# Patient Record
Sex: FEMALE
[De-identification: names, ages, dates, MRNs, and addresses within clinical notes are randomized per-mention and may not be internally consistent; named-entity substitution may affect disease eponyms.]

---

## 2022-04-04 PROBLEM — Z00.00 ENCOUNTER FOR PREVENTIVE HEALTH EXAMINATION: Status: ACTIVE | Noted: 2022-04-04

## 2022-04-05 ENCOUNTER — APPOINTMENT (OUTPATIENT)
Dept: PULMONOLOGY | Facility: CLINIC | Age: 87
End: 2022-04-05
Payer: MEDICARE

## 2022-04-05 VITALS
SYSTOLIC BLOOD PRESSURE: 130 MMHG | BODY MASS INDEX: 21.83 KG/M2 | DIASTOLIC BLOOD PRESSURE: 88 MMHG | OXYGEN SATURATION: 97 % | TEMPERATURE: 97.3 F | WEIGHT: 131 LBS | HEART RATE: 73 BPM | HEIGHT: 65 IN

## 2022-04-05 DIAGNOSIS — J20.9 ACUTE BRONCHITIS, UNSPECIFIED: ICD-10-CM

## 2022-04-05 PROCEDURE — 99204 OFFICE O/P NEW MOD 45 MIN: CPT

## 2022-04-05 RX ORDER — ASPIRIN ENTERIC COATED TABLETS 81 MG 81 MG/1
81 TABLET, DELAYED RELEASE ORAL
Refills: 0 | Status: ACTIVE | COMMUNITY

## 2022-04-05 RX ORDER — METOPROLOL TARTRATE 100 MG/1
100 TABLET, FILM COATED ORAL
Refills: 0 | Status: ACTIVE | COMMUNITY

## 2022-04-05 NOTE — PHYSICAL EXAM
[No Acute Distress] : no acute distress [Normal Oropharynx] : normal oropharynx [Normal Appearance] : normal appearance [No Neck Mass] : no neck mass [Normal Rate/Rhythm] : normal rate/rhythm [Normal S1, S2] : normal s1, s2 [No Murmurs] : no murmurs [No Resp Distress] : no resp distress [Clear to Auscultation Bilaterally] : clear to auscultation bilaterally [No Abnormalities] : no abnormalities [Benign] : benign [Normal Gait] : normal gait [No Clubbing] : no clubbing [No Cyanosis] : no cyanosis [No Edema] : no edema [FROM] : FROM [Normal Color/ Pigmentation] : normal color/ pigmentation [No Focal Deficits] : no focal deficits [Oriented x3] : oriented x3 [Normal Affect] : normal affect [TextBox_68] : minimal rhonchi bilaterally

## 2022-04-05 NOTE — REASON FOR VISIT
[Initial] : an initial visit [Cough] : cough [Shortness of Breath] : shortness of breath [TextBox_44] : assessmnet. Pt was diagnosed with COVID-19 mid January of 2022. Pt states since then she has been experiencing SOB with minimal activity and a wet cough that presents clear to yellow phlegm and sometimes blood is present.

## 2022-04-05 NOTE — HISTORY OF PRESENT ILLNESS
[Never] : never [TextBox_4] : 86 female no hx tobacco\par presents today because of dyspnea and congestion in right lung and awakens pt ant nite and ++mucous yellow\par no fever no blood\par occ wheeze in chest  and used albuterol with relief\par covid Jan 2022 no hosp home isolation  and  both very sick\par remains fatigue\par has had multiple viral infections since\par no prior resp pathology\par rapid pulse yesterday in am and [facility] and told abn electrolytes and dced home

## 2022-04-05 NOTE — DISCUSSION/SUMMARY
[FreeTextEntry1] : Ms. Monson has residual bronchitis and congestion.  Is certainly possible this could be from long hauler Covid.  She does have mild sputum production.  She was recently at the [facility] emergency room for tachycardia and was given doxycycline 100 mg twice a day for the congestion.  I have asked her to start Trelegy 100 mg 1 inhalation once a day as there is some anecdotal reports that these can help the long-haul COVID symptoms.  Patient also instructed to take Robitussin-DM on an as-needed basis.  She understands and agrees with this plan of care.\par The patient understands and agrees with plan of care.\par Today's office visit encompassed 46  minutes. I conducted an extensive history ,physical exam and reviewed diagnosis and treatment options  including diagnostic tests,radiologic studies including cat scans  and the use of prescription medication.

## 2022-05-03 ENCOUNTER — APPOINTMENT (OUTPATIENT)
Dept: PULMONOLOGY | Facility: CLINIC | Age: 87
End: 2022-05-03
Payer: MEDICARE

## 2022-05-03 VITALS
HEIGHT: 64.5 IN | TEMPERATURE: 96.8 F | WEIGHT: 130 LBS | OXYGEN SATURATION: 98 % | SYSTOLIC BLOOD PRESSURE: 110 MMHG | BODY MASS INDEX: 21.93 KG/M2 | DIASTOLIC BLOOD PRESSURE: 70 MMHG | HEART RATE: 64 BPM

## 2022-05-03 PROCEDURE — 99214 OFFICE O/P EST MOD 30 MIN: CPT

## 2022-05-03 RX ORDER — POTASSIUM CHLORIDE 1500 MG/1
20 TABLET, FILM COATED, EXTENDED RELEASE ORAL
Refills: 0 | Status: DISCONTINUED | COMMUNITY
End: 2022-05-03

## 2022-05-03 NOTE — REASON FOR VISIT
[Follow-Up] : a follow-up visit [Cough] : cough [Shortness of Breath] : shortness of breath [Wheezing] : wheezing [TextBox_44] : 1 month. Pt presents similar sx as last ov. Pt states antibiotics subsided her sx but once completed sx came back, Pt complains of SOB with minimal activity, a wet cough that presents yellow thick phlegm and occasional wheezing.

## 2022-05-03 NOTE — DISCUSSION/SUMMARY
[FreeTextEntry1] : Ms. Monson has very nonspecific symptoms of cough and dyspnea.  She has no history of tobacco and is possible this is all long-haul COVID symptoms.  She is no longer using her Trelegy and I favor she restart the Trelegy 1 inhalation daily and see if this offers any relief.  The patient understands and agrees with this plan of care.\par The patient understands and agrees with plan of care.\par Today's office visit encompassed 32 minutes. I conducted an extensive history ,physical exam and reviewed diagnosis and treatment options  including diagnostic tests,radiologic studies including  cat scans  and the use of prescription medication.

## 2022-05-03 NOTE — HISTORY OF PRESENT ILLNESS
[Never] : never [TextBox_4] : 86 female hx bronchitis for fu visit\par episode of dizziness this am requiring lying in bed\par sx resolved after 5 minutes  \par seen by pmd yesterday and inc dose of losartan\par hx TIA March 2022 and now on xarelto no carotid bolckage\par breathing sl labored  with shower sl wheeze  and cough and congestion\par used trelegy  and stopped ?reason

## 2022-05-20 ENCOUNTER — APPOINTMENT (OUTPATIENT)
Dept: PULMONOLOGY | Facility: CLINIC | Age: 87
End: 2022-05-20
Payer: MEDICARE

## 2022-05-20 VITALS
SYSTOLIC BLOOD PRESSURE: 119 MMHG | TEMPERATURE: 97.3 F | HEIGHT: 64.5 IN | WEIGHT: 130 LBS | HEART RATE: 68 BPM | DIASTOLIC BLOOD PRESSURE: 73 MMHG | OXYGEN SATURATION: 96 % | BODY MASS INDEX: 21.93 KG/M2

## 2022-05-20 DIAGNOSIS — J90 PLEURAL EFFUSION, NOT ELSEWHERE CLASSIFIED: ICD-10-CM

## 2022-05-20 PROCEDURE — 99214 OFFICE O/P EST MOD 30 MIN: CPT

## 2022-05-20 NOTE — REASON FOR VISIT
[Cough] : cough [Shortness of Breath] : shortness of breath [TextBox_44] : pt still presents laryngitis, body weakness, loss of appetite and SOB with minimal activity.

## 2022-05-20 NOTE — DISCUSSION/SUMMARY
[FreeTextEntry1] : Ms. Monson has multiple nonspecific symptoms.  The CAT scan of the chest reveals small pleural effusions left greater than right.  I do not think there accessible for thoracentesis.  She does have enlarged heart but no signs of fluid overload.  I asked her to obtain an echocardiogram to ensure there is normal cardiac function.  She continues to cough up discolored phlegm and rather than initiate another antibiotic we will obtain him culture to see if any dominant organism is present.  I will discuss this with her primary care physician Dr. Cruz when available.  This been discussed with the patient she understands and agrees.\par The patient understands and agrees with plan of care.\par Today's office visit encompassed 32 minutes. I conducted an extensive history ,physical exam and reviewed diagnosis and treatment options  including diagnostic tests,radiologic studies including  cat scans  and the use of prescription medication.

## 2022-05-20 NOTE — PHYSICAL EXAM
[No Acute Distress] : no acute distress [Normal Oropharynx] : normal oropharynx [Normal Appearance] : normal appearance [No Neck Mass] : no neck mass [Normal Rate/Rhythm] : normal rate/rhythm [Normal S1, S2] : normal s1, s2 [No Murmurs] : no murmurs [No Resp Distress] : no resp distress [Clear to Auscultation Bilaterally] : clear to auscultation bilaterally [No Abnormalities] : no abnormalities [Benign] : benign [Normal Gait] : normal gait [No Clubbing] : no clubbing [No Cyanosis] : no cyanosis [No Edema] : no edema [FROM] : FROM [Normal Color/ Pigmentation] : normal color/ pigmentation [No Focal Deficits] : no focal deficits [Oriented x3] : oriented x3 [Normal Affect] : normal affect [TextBox_68] : sll rhonchi right base

## 2022-05-20 NOTE — HISTORY OF PRESENT ILLNESS
[Never] : never [TextBox_4] : 86 female hx sob and wt loss  low fever and ct chest bilateral effusions\par felt better after abx\par today feels poor overall sl improved weak\par no fever no chest pain no tightness \par

## 2022-05-26 DIAGNOSIS — R09.02 HYPOXEMIA: ICD-10-CM

## 2022-05-31 ENCOUNTER — NON-APPOINTMENT (OUTPATIENT)
Age: 87
End: 2022-05-31

## 2022-06-22 ENCOUNTER — NON-APPOINTMENT (OUTPATIENT)
Age: 87
End: 2022-06-22

## 2022-06-28 ENCOUNTER — APPOINTMENT (OUTPATIENT)
Dept: PULMONOLOGY | Facility: CLINIC | Age: 87
End: 2022-06-28

## 2022-06-28 VITALS
SYSTOLIC BLOOD PRESSURE: 118 MMHG | BODY MASS INDEX: 21.93 KG/M2 | HEIGHT: 64.5 IN | TEMPERATURE: 96.9 F | OXYGEN SATURATION: 97 % | DIASTOLIC BLOOD PRESSURE: 80 MMHG | HEART RATE: 73 BPM | WEIGHT: 130 LBS

## 2022-06-28 PROCEDURE — 99214 OFFICE O/P EST MOD 30 MIN: CPT

## 2022-06-28 RX ORDER — CIPROFLOXACIN HYDROCHLORIDE 500 MG/1
500 TABLET, FILM COATED ORAL TWICE DAILY
Qty: 42 | Refills: 1 | Status: DISCONTINUED | COMMUNITY
Start: 2022-05-31 | End: 2022-06-28

## 2022-06-28 NOTE — HISTORY OF PRESENT ILLNESS
[Never] : never [TextBox_4] : 86 female hx dyspnea and chf for fu \par today feels poor co hoarse voice and cough an mucous \par poor appetite and has lost 2 lb in 1 week\par dx covid december 2021 \par treated with cipro for staph in past  and now inc sputum once again no fever  +yellow

## 2022-06-28 NOTE — PROCEDURE
[FreeTextEntry1] : Echocardiogram performed May 24, 2022 reduced LV function EF 30 to 35%.  Moderate tricuspid regurg and severe pulmonary hypertension\par Sputum culture revealed staph aureus susceptible to Cipro\par CT chest angiogram 5/27/2022 no pulm embolus small bilateral effusions enlarged heart

## 2022-06-28 NOTE — REASON FOR VISIT
[Follow-Up] : a follow-up visit [Cough] : cough [Shortness of Breath] : shortness of breath [TextBox_44] : 6 weeks, pleural effusion. Pt complains of SOB with minimal activity, a productive cough that presents clear phlegm and crackling in chest when in supine position. Pt would like to inquire about Trelegy as it has given her relief in the past.

## 2022-06-28 NOTE — DISCUSSION/SUMMARY
[FreeTextEntry1] : Ms. Monson has multiple nonspecific symptoms.  Her cough has returned with discolored sputum.  She was treated for staph aureus approxi-1 month ago with Cipro.  She is not allergic to penicillin and it is methicillin sensitive.  I have asked her to take amoxicillin 500 mg 3 times a day for the next 10 days.  She had some relief with Trelegy in the past.  I have asked her to start Trelegy 100 mg 1 inhalation daily.  She does not seem to be in congestive heart failure at this time.  Is possible her multiple symptoms are secondary to long-haul COVID.  There is been explained to the patient she understands and agrees.\par The patient understands and agrees with plan of care.\par Today's office visit encompassed 32 minutes. I conducted an extensive history ,physical exam and reviewed diagnosis and treatment options  including diagnostic tests,radiologic studies including  cat scans  and the use of prescription medication. \par

## 2022-07-12 ENCOUNTER — NON-APPOINTMENT (OUTPATIENT)
Age: 87
End: 2022-07-12

## 2022-07-12 ENCOUNTER — APPOINTMENT (OUTPATIENT)
Dept: PULMONOLOGY | Facility: CLINIC | Age: 87
End: 2022-07-12

## 2022-07-12 VITALS
SYSTOLIC BLOOD PRESSURE: 118 MMHG | OXYGEN SATURATION: 91 % | HEIGHT: 64 IN | DIASTOLIC BLOOD PRESSURE: 68 MMHG | BODY MASS INDEX: 21.17 KG/M2 | HEART RATE: 63 BPM | WEIGHT: 124 LBS | TEMPERATURE: 96.9 F

## 2022-07-12 DIAGNOSIS — U09.9 POST COVID-19 CONDITION, UNSPECIFIED: ICD-10-CM

## 2022-07-12 PROCEDURE — 94010 BREATHING CAPACITY TEST: CPT

## 2022-07-12 PROCEDURE — 99214 OFFICE O/P EST MOD 30 MIN: CPT | Mod: 25

## 2022-07-12 NOTE — DISCUSSION/SUMMARY
[FreeTextEntry1] : Ms. Monson has persistent dyspnea on exertion and coughing with congestion.  She has been on multiple antibiotics.  She does not feel the Trelegy is helping and her spirometry is normal.  I favor we DC the inhaler.  Patient does note some wheezing occasionally in the morning but I do not hear any on exam.  Her echocardiogram does show an ejection fraction of 30 to 35% and I am concerned that all of her shortness of breath and lethargy is secondary to her underlying cardiac dysfunction.  I have asked her to obtain a sputum culture.  Based on those results we will decide on antibiotic therapy.  The patient understands and agrees with this plan of care.\par The patient understands and agrees with plan of care.\par Today's office visit encompassed 32 minutes. I conducted an extensive history ,physical exam and reviewed diagnosis and treatment options  including diagnostic tests,radiologic studies including  cat scans  and the use of prescription medication.

## 2022-07-12 NOTE — REASON FOR VISIT
[Follow-Up] : a follow-up visit [Cough] : cough [Shortness of Breath] : shortness of breath [Wheezing] : wheezing [TextBox_44] : 2 weeks, long haul covid symptoms

## 2022-07-12 NOTE — HISTORY OF PRESENT ILLNESS
[Never] : never [TextBox_4] : 87 female hx covid long hauler for fu visit\par recently treated amoxicillin for staph \par no improvement on amoxicillin\par no fever ++phlegm still  thick and brown\par no chest pain no tightness except sharp pain center of chest lasted 30 sec\par ++wheeze and congestion bulmaro in am\par using trelegy 1 qd and no change\par +covid vaccine\par

## 2022-08-09 ENCOUNTER — APPOINTMENT (OUTPATIENT)
Dept: PULMONOLOGY | Facility: CLINIC | Age: 87
End: 2022-08-09

## 2022-08-09 VITALS
BODY MASS INDEX: 20.83 KG/M2 | HEIGHT: 64 IN | OXYGEN SATURATION: 96 % | TEMPERATURE: 97.3 F | DIASTOLIC BLOOD PRESSURE: 68 MMHG | WEIGHT: 122 LBS | SYSTOLIC BLOOD PRESSURE: 101 MMHG | HEART RATE: 66 BPM

## 2022-08-09 PROCEDURE — 99213 OFFICE O/P EST LOW 20 MIN: CPT

## 2022-08-09 RX ORDER — LOSARTAN POTASSIUM 100 MG/1
100 TABLET, FILM COATED ORAL
Refills: 0 | Status: DISCONTINUED | COMMUNITY
End: 2022-08-09

## 2022-08-09 RX ORDER — METHYLPREDNISOLONE 4 MG/1
4 TABLET ORAL
Qty: 21 | Refills: 0 | Status: DISCONTINUED | COMMUNITY
Start: 2022-02-21 | End: 2022-08-09

## 2022-08-09 RX ORDER — ATORVASTATIN CALCIUM 10 MG/1
10 TABLET, FILM COATED ORAL
Refills: 0 | Status: DISCONTINUED | COMMUNITY
End: 2022-08-09

## 2022-08-09 RX ORDER — DOXYCYCLINE HYCLATE 100 MG/1
100 CAPSULE ORAL
Qty: 14 | Refills: 0 | Status: DISCONTINUED | COMMUNITY
Start: 2022-04-04 | End: 2022-08-09

## 2022-08-09 NOTE — REASON FOR VISIT
[Follow-Up] : a follow-up visit [Shortness of Breath] : shortness of breath [TextBox_44] : 1 month, pleural effusion. Sputum culture done at [facility]. Pt presents similar sx. Still feeling fatigue and body weakness.

## 2022-08-09 NOTE — DISCUSSION/SUMMARY
[FreeTextEntry1] : Ms. Monson has no acute respiratory distress at this time.  She had a staff upper respiratory infection which was treated appropriately with amoxicillin.  She denies any significant symptoms.  She has been seen by Dr. Roberts cardiology and her congestive heart failure is being treated aggressively.  At this time I do not think there is any acute pulmonary issue and no further therapy recommended.\par The patient understands and agrees with plan of care.\par Today's office visit encompassed 32 minutes. I conducted an extensive history ,physical exam and reviewed diagnosis and treatment options  including diagnostic tests,radiologic studies including  cat scans  and the use of prescription medication.

## 2022-08-09 NOTE — HISTORY OF PRESENT ILLNESS
[Never] : never [TextBox_4] : 87 female hx sob and bronchitis\par co some lethargy\par dec amt phlegm but worse in am and congestion in chest \par seen by cardiology and told chf and meds altered\par

## 2022-11-10 ENCOUNTER — APPOINTMENT (OUTPATIENT)
Dept: PULMONOLOGY | Facility: CLINIC | Age: 87
End: 2022-11-10

## 2022-11-10 VITALS
BODY MASS INDEX: 20.83 KG/M2 | OXYGEN SATURATION: 97 % | DIASTOLIC BLOOD PRESSURE: 70 MMHG | TEMPERATURE: 97.9 F | SYSTOLIC BLOOD PRESSURE: 122 MMHG | WEIGHT: 122 LBS | HEART RATE: 65 BPM | HEIGHT: 64 IN

## 2022-11-10 DIAGNOSIS — R06.09 OTHER FORMS OF DYSPNEA: ICD-10-CM

## 2022-11-10 PROCEDURE — 99214 OFFICE O/P EST MOD 30 MIN: CPT

## 2022-11-10 RX ORDER — FUROSEMIDE 20 MG/1
20 TABLET ORAL
Qty: 10 | Refills: 0 | Status: ACTIVE | COMMUNITY
Start: 2022-10-25

## 2022-11-10 NOTE — DISCUSSION/SUMMARY
[FreeTextEntry1] : Ms. Monson has a cough for the last 2 to 3 months.  It is discolored with occasional blood.  It is likely the blood is from the constant coughing and irritation to the airway.  I have asked him to obtain a chest x-ray to see if any infiltrate is present and sputum cultures to see if any specific bacteria is present.  We will start her on amoxicillin 500 mg 3 times a day for the next 10 days.  She is found in the past that this is helped her breathing.  The patient will return in approximately 2 to 3 weeks time to check on her progress.\par The patient understands and agrees with plan of care.\par Today's office visit encompassed 32 minutes. I conducted an extensive history ,physical exam and reviewed diagnosis and treatment options  including diagnostic tests,radiologic studies including  cat scans  and the use of prescription medication.

## 2022-11-10 NOTE — HISTORY OF PRESENT ILLNESS
[Never] : never [TextBox_4] : 87 female hx chf for fu visit\par today co cough  and mucous +brown yellow and occ blood\par no fever no chest pain intermittent wheeze and noise aright lung with lying down\par this has occurred for  3 months\par no ill exposure \par full activity  full  activities of daily living \par

## 2022-11-10 NOTE — REASON FOR VISIT
[Follow-Up] : a follow-up visit [Cough] : cough [Shortness of Breath] : shortness of breath [Wheezing] : wheezing [TextBox_44] : 4 months and pleural effusion. Pt states that the coughing up blood has come back.

## 2022-11-21 ENCOUNTER — NON-APPOINTMENT (OUTPATIENT)
Age: 87
End: 2022-11-21

## 2022-12-01 ENCOUNTER — APPOINTMENT (OUTPATIENT)
Dept: PULMONOLOGY | Facility: CLINIC | Age: 87
End: 2022-12-01

## 2022-12-01 VITALS
BODY MASS INDEX: 20.66 KG/M2 | HEIGHT: 64 IN | OXYGEN SATURATION: 94 % | HEART RATE: 65 BPM | SYSTOLIC BLOOD PRESSURE: 132 MMHG | TEMPERATURE: 97.4 F | DIASTOLIC BLOOD PRESSURE: 80 MMHG | WEIGHT: 121 LBS

## 2022-12-01 DIAGNOSIS — J20.9 ACUTE BRONCHITIS, UNSPECIFIED: ICD-10-CM

## 2022-12-01 PROCEDURE — 99214 OFFICE O/P EST MOD 30 MIN: CPT

## 2022-12-01 NOTE — DISCUSSION/SUMMARY
[FreeTextEntry1] : Ms Monson has a chronic cough with discolored sputum and occasional blood streaking.  She denies all other symptoms.  Her sputum culture showed normal gayle.  We do not have the AFB or fungus cultures back as of yet.  I favor a trial of antibiotics.  She is taken penicillin in the past without difficulty but because of the controversy we will start doxycycline 100 mg of 1 tablet twice a day for the next 10 days.  If the patient does not feel any better she will call me for further evaluation.\par The patient understands and agrees with plan of care.\par Today's office visit encompassed 32 minutes. I conducted an extensive history ,physical exam and reviewed diagnosis and treatment options  including diagnostic tests,radiologic studies including  cat scans  and the use of prescription medication.

## 2022-12-01 NOTE — REASON FOR VISIT
[Follow-Up] : a follow-up visit [Cough] : cough [Shortness of Breath] : shortness of breath [TextBox_44] : 3 week, Pleural effusion Dyspnea on excertion, Hypoxia, Patient states red and green mucus when coughing up phlegm.

## 2022-12-01 NOTE — HISTORY OF PRESENT ILLNESS
[Never] : never [TextBox_4] : 87 female hx cough for few months \par sputum cx sent ot [facility] no results sent to office\par did not nuse amoxicillin bec of ?allergy  but has used pcn and amoxicillin in past\par still cough with occ blood stain in sputum\par no fever no chest pain no tightness \par +occ dyspnea + wheeze

## 2023-09-26 ENCOUNTER — APPOINTMENT (OUTPATIENT)
Dept: ORTHOPEDIC SURGERY | Facility: CLINIC | Age: 88
End: 2023-09-26
Payer: MEDICARE

## 2023-09-26 VITALS — BODY MASS INDEX: 20.66 KG/M2 | HEIGHT: 64 IN | WEIGHT: 121 LBS

## 2023-09-26 PROCEDURE — 99213 OFFICE O/P EST LOW 20 MIN: CPT

## 2023-09-26 PROCEDURE — 73560 X-RAY EXAM OF KNEE 1 OR 2: CPT | Mod: FY,LT

## 2023-09-26 PROCEDURE — L1833: CPT | Mod: KV,KX,LT

## 2023-10-17 ENCOUNTER — APPOINTMENT (OUTPATIENT)
Dept: ORTHOPEDIC SURGERY | Facility: CLINIC | Age: 88
End: 2023-10-17
Payer: MEDICARE

## 2023-10-17 VITALS — BODY MASS INDEX: 20.66 KG/M2 | WEIGHT: 121 LBS | HEIGHT: 64 IN

## 2023-10-17 PROCEDURE — 73560 X-RAY EXAM OF KNEE 1 OR 2: CPT | Mod: LT

## 2023-10-17 PROCEDURE — 99024 POSTOP FOLLOW-UP VISIT: CPT

## 2023-11-28 ENCOUNTER — APPOINTMENT (OUTPATIENT)
Dept: ORTHOPEDIC SURGERY | Facility: CLINIC | Age: 88
End: 2023-11-28
Payer: MEDICARE

## 2023-11-28 VITALS — BODY MASS INDEX: 20.66 KG/M2 | WEIGHT: 121 LBS | HEIGHT: 64 IN

## 2023-11-28 DIAGNOSIS — Z78.9 OTHER SPECIFIED HEALTH STATUS: ICD-10-CM

## 2023-11-28 DIAGNOSIS — Z96.652 PRESENCE OF LEFT ARTIFICIAL KNEE JOINT: ICD-10-CM

## 2023-11-28 PROCEDURE — 73562 X-RAY EXAM OF KNEE 3: CPT | Mod: LT

## 2023-11-28 PROCEDURE — 99024 POSTOP FOLLOW-UP VISIT: CPT

## 2023-12-28 ENCOUNTER — APPOINTMENT (OUTPATIENT)
Dept: PULMONOLOGY | Facility: CLINIC | Age: 88
End: 2023-12-28

## 2024-01-10 ENCOUNTER — APPOINTMENT (OUTPATIENT)
Dept: PULMONOLOGY | Facility: CLINIC | Age: 89
End: 2024-01-10
Payer: MEDICARE

## 2024-01-10 VITALS
DIASTOLIC BLOOD PRESSURE: 70 MMHG | WEIGHT: 121 LBS | HEART RATE: 74 BPM | BODY MASS INDEX: 20.66 KG/M2 | HEIGHT: 64 IN | SYSTOLIC BLOOD PRESSURE: 124 MMHG | TEMPERATURE: 97.3 F | OXYGEN SATURATION: 97 %

## 2024-01-10 DIAGNOSIS — U07.1 COVID-19: ICD-10-CM

## 2024-01-10 PROCEDURE — 99214 OFFICE O/P EST MOD 30 MIN: CPT

## 2024-01-10 RX ORDER — AMOXICILLIN 500 MG/1
500 TABLET, FILM COATED ORAL 3 TIMES DAILY
Qty: 30 | Refills: 3 | Status: DISCONTINUED | COMMUNITY
Start: 2022-06-28 | End: 2024-01-10

## 2024-01-10 RX ORDER — ZINC SULFATE 50(220)MG
50 CAPSULE ORAL
Refills: 0 | Status: DISCONTINUED | COMMUNITY
End: 2024-01-10

## 2024-01-10 RX ORDER — ASCORBIC ACID 500 MG
TABLET ORAL
Refills: 0 | Status: DISCONTINUED | COMMUNITY
End: 2024-01-10

## 2024-01-10 RX ORDER — DOXYCYCLINE HYCLATE 100 MG/1
100 TABLET ORAL
Qty: 20 | Refills: 1 | Status: DISCONTINUED | COMMUNITY
Start: 2022-12-01 | End: 2024-01-10

## 2024-01-10 RX ORDER — RIVAROXABAN 20 MG/1
20 TABLET, FILM COATED ORAL
Refills: 0 | Status: DISCONTINUED | COMMUNITY
End: 2024-01-10

## 2024-01-10 RX ORDER — AMLODIPINE BESYLATE 2.5 MG/1
2.5 TABLET ORAL
Qty: 90 | Refills: 0 | Status: DISCONTINUED | COMMUNITY
Start: 2021-09-08 | End: 2024-01-10

## 2024-01-10 RX ORDER — LOSARTAN POTASSIUM 50 MG/1
50 TABLET, FILM COATED ORAL
Qty: 90 | Refills: 0 | Status: DISCONTINUED | COMMUNITY
Start: 2021-09-16 | End: 2024-01-10

## 2024-01-10 NOTE — REASON FOR VISIT
[Follow-Up] : a follow-up visit [Cough] : cough [Spouse] : spouse [Other: _____] : [unfilled] [TextBox_44] : Patient states she has an excessive amount of phlegm that is hard to cough up.  She has crackling in her chest.

## 2024-01-10 NOTE — HISTORY OF PRESENT ILLNESS
[Never] : never [TextBox_4] : 88 female +covid 3 weeks ago no hosp but went for outpt iv therapy today feels poor weak and cough and  mucous  no fever

## 2024-01-10 NOTE — DISCUSSION/SUMMARY
[FreeTextEntry1] : Ms. Monson recently had COVID infection.  She refused to be admitted to the hospital but went back daily for infusions.  She is feeling better but still slight cough and congestion.  Her lungs are clear.  I do not want to start her on any oral steroids as she has a fairly normal exam.  I favor supportive care i.e. cold and sinus medicines for decongestant and antitussive effects.  If her symptoms worsen she will return. The patient understands and agrees with plan of care. Today's office visit encompassed 32 minutes. I conducted an extensive history, physical exam and reviewed diagnosis and treatment options including diagnostic tests,radiology studies including cat scans and the use of prescription medication.

## 2024-01-11 ENCOUNTER — APPOINTMENT (OUTPATIENT)
Dept: ORTHOPEDIC SURGERY | Facility: CLINIC | Age: 89
End: 2024-01-11
Payer: MEDICARE

## 2024-01-11 DIAGNOSIS — S72.452A DISPLACED SUPRACONDYLAR FRACTURE W/OUT INTRACONDYLAR EXTENSION OF LOWER END OF LEFT FEMUR, INITIAL ENCOUNTER FOR CLOSED FRACTURE: ICD-10-CM

## 2024-01-11 PROCEDURE — 99213 OFFICE O/P EST LOW 20 MIN: CPT

## 2024-01-11 PROCEDURE — 73562 X-RAY EXAM OF KNEE 3: CPT | Mod: LT

## 2024-01-11 NOTE — DISCUSSION/SUMMARY
[de-identified] : Reviewed x-rays Explained the importance of ice and rest.    Stretching exercises shown, Explained the importance of Range of Motion before strength.  discussed with patient to procced with walking to exercise the muscles.    f/u PNR

## 2024-01-11 NOTE — PHYSICAL EXAM
[NL (0)] : extension 0 degrees [5___] : hamstring 5[unfilled]/5 [Left] : left knee [AP] : anteroposterior [Lateral] : lateral [[place]] : skyline [] : no extensor lag [FreeTextEntry9] : WA small calcifications at fracture sight no displaces of fracture.   [TWNoteComboBox7] : flexion 125 degrees

## 2024-01-11 NOTE — PHYSICAL EXAM
[NL (0)] : extension 0 degrees [5___] : hamstring 5[unfilled]/5 [Left] : left knee [AP] : anteroposterior [Lateral] : lateral [[place]] : skyline [] : no extensor lag [FreeTextEntry9] : WA small calcifications at fracture sight no displaces of fracture.   [TWNoteComboBox7] : flexion 125 degrees

## 2024-01-16 ENCOUNTER — APPOINTMENT (OUTPATIENT)
Dept: PULMONOLOGY | Facility: CLINIC | Age: 89
End: 2024-01-16

## 2024-03-14 ENCOUNTER — APPOINTMENT (OUTPATIENT)
Dept: PULMONOLOGY | Facility: CLINIC | Age: 89
End: 2024-03-14

## 2024-03-26 ENCOUNTER — APPOINTMENT (OUTPATIENT)
Dept: PULMONOLOGY | Facility: CLINIC | Age: 89
End: 2024-03-26
Payer: MEDICARE

## 2024-03-26 VITALS
WEIGHT: 121 LBS | HEIGHT: 64 IN | SYSTOLIC BLOOD PRESSURE: 132 MMHG | DIASTOLIC BLOOD PRESSURE: 90 MMHG | HEART RATE: 65 BPM | OXYGEN SATURATION: 95 % | TEMPERATURE: 96.3 F | BODY MASS INDEX: 20.66 KG/M2

## 2024-03-26 PROCEDURE — 99214 OFFICE O/P EST MOD 30 MIN: CPT

## 2024-03-26 RX ORDER — CHOLECALCIFEROL (VITAMIN D3) 25 MCG
TABLET ORAL
Refills: 0 | Status: DISCONTINUED | COMMUNITY
End: 2024-03-26

## 2024-03-26 NOTE — DISCUSSION/SUMMARY
[FreeTextEntry1] : Ms. Monson has chronic cough and sputum production.  This is persistent over the last 2 years since COVID.  Her exam is clear at this time.  We are going to obtain a sputum culture.  I have also asked her to obtain a chest x-ray and sinus x-rays.  Once these results are available further recommendations will be made. The patient understands and agrees with plan of care. Today's office visit encompassed 32 minutes. I conducted an extensive history, physical exam and reviewed diagnosis and treatment options including diagnostic tests,radiology studies including cat scans and the use of prescription medication.

## 2024-03-26 NOTE — REASON FOR VISIT
[Follow-Up] : a follow-up visit [Cough] : cough [Shortness of Breath] : shortness of breath [Spouse] : spouse [Other: _____] : [unfilled] [TextBox_44] : sneezing [TextEntry] : Patient states she is stll experiencing a wet phlegm with her cough, sometimes there is blood in the phlegm.  Patient has occasional SOB.

## 2024-03-26 NOTE — HISTORY OF PRESENT ILLNESS
[Never] : never [TextBox_4] : 88 female co persistent mucous  since covid  2 y ago thick phlegm and occ blood  no chest pain no tightness no wheeze no fever  occ dyspnea   some wheeze if lying down

## 2024-04-04 ENCOUNTER — APPOINTMENT (OUTPATIENT)
Dept: ORTHOPEDIC SURGERY | Facility: CLINIC | Age: 89
End: 2024-04-04
Payer: MEDICARE

## 2024-04-04 VITALS — WEIGHT: 121 LBS | HEIGHT: 64 IN | BODY MASS INDEX: 20.66 KG/M2

## 2024-04-04 DIAGNOSIS — Z96.652 PRESENCE OF LEFT ARTIFICIAL KNEE JOINT: ICD-10-CM

## 2024-04-04 DIAGNOSIS — Z78.9 OTHER SPECIFIED HEALTH STATUS: ICD-10-CM

## 2024-04-04 PROCEDURE — 73562 X-RAY EXAM OF KNEE 3: CPT | Mod: LT

## 2024-04-04 PROCEDURE — 99213 OFFICE O/P EST LOW 20 MIN: CPT

## 2024-04-04 NOTE — DISCUSSION/SUMMARY
[de-identified] : She has been icing knee with good relief. She states she is on blood thinners and therefore NSAIDS are contraindicated. She is ambulating well with cane. She may take Tylenol to help manage her pain.  Today's plan is HEP and continue icing and heat.  f/u PRN

## 2024-04-04 NOTE — REASON FOR VISIT
[FreeTextEntry2] : here for left knee pain that started 2 days ago NKI.  c/o increasing pain medially  using cane for ambulation, painful to put weight on it. using tylenol and ice for pain.  states knee pain is better with rest.

## 2024-04-04 NOTE — PHYSICAL EXAM
[NL (0)] : extension 0 degrees [4___] : hamstring 4[unfilled]/5 [] : ambulation with cane [Left] : left knee [All Views] : anteroposterior, lateral, skyline, and anteroposterior standing [Components well fixed, in good position] : Components well fixed, in good position [FreeTextEntry8] : medial hamstring tendons at medial joint line. tender pes anserinus [de-identified] : valgus alignment [FreeTextEntry9] : well aligned no new fracture lines [TWNoteComboBox7] : flexion 120 degrees

## 2024-04-16 ENCOUNTER — NON-APPOINTMENT (OUTPATIENT)
Age: 89
End: 2024-04-16

## 2024-04-16 ENCOUNTER — APPOINTMENT (OUTPATIENT)
Dept: PULMONOLOGY | Facility: CLINIC | Age: 89
End: 2024-04-16
Payer: MEDICARE

## 2024-04-16 VITALS
WEIGHT: 121 LBS | SYSTOLIC BLOOD PRESSURE: 120 MMHG | DIASTOLIC BLOOD PRESSURE: 78 MMHG | TEMPERATURE: 98 F | BODY MASS INDEX: 20.66 KG/M2 | HEART RATE: 60 BPM | HEIGHT: 64 IN | OXYGEN SATURATION: 96 %

## 2024-04-16 PROCEDURE — 99214 OFFICE O/P EST MOD 30 MIN: CPT

## 2024-04-16 RX ORDER — DABIGATRAN ETEXILATE 75 MG/1
75 CAPSULE ORAL
Refills: 0 | Status: ACTIVE | COMMUNITY

## 2024-04-16 RX ORDER — ATORVASTATIN CALCIUM 10 MG/1
10 TABLET, FILM COATED ORAL
Refills: 0 | Status: ACTIVE | COMMUNITY

## 2024-04-16 NOTE — DISCUSSION/SUMMARY
[FreeTextEntry1] : Patient has persistent cough with thick discolored phlegm.  Chest x-ray and sinus x-rays are normal.  Often these are secondary to gram-negative's.  She will be bringing in sputum cultures today.  We will try Cipro 500 mg twice daily for the next 14 days.  If the sputum cultures reveal another bacteria we will change her antibiotics.  This been explained to the patient she understands and agrees. The patient understands and agrees with plan of care. Today's office visit encompassed 32 minutes. I conducted an extensive history, physical exam and reviewed diagnosis and treatment options including diagnostic tests,radiology studies including cat scans and the use of prescription medication.

## 2024-04-16 NOTE — HISTORY OF PRESENT ILLNESS
[Never] : never [TextBox_4] : 88 female co persistent cough  a nd congestion and sneeze 8 x per day no new exposure at home  + phlegm grey with occ blood  occ brown never obtained culture no fever no chest pain no tightness  o noise right thorax

## 2024-04-16 NOTE — REASON FOR VISIT
[Follow-Up] : a follow-up visit [Cough] : cough [Shortness of Breath] : shortness of breath [TextEntry] : Patient states she is still experiencing a wet phlegm with her cough, sometimes there is blood in the phlegm. Patient complaints of sob and crackles of her lung when breathing.

## 2024-05-15 ENCOUNTER — NON-APPOINTMENT (OUTPATIENT)
Age: 89
End: 2024-05-15

## 2024-05-16 ENCOUNTER — APPOINTMENT (OUTPATIENT)
Dept: PULMONOLOGY | Facility: CLINIC | Age: 89
End: 2024-05-16
Payer: MEDICARE

## 2024-05-16 VITALS
HEIGHT: 64 IN | TEMPERATURE: 97.9 F | DIASTOLIC BLOOD PRESSURE: 80 MMHG | OXYGEN SATURATION: 97 % | BODY MASS INDEX: 20.66 KG/M2 | WEIGHT: 121 LBS | SYSTOLIC BLOOD PRESSURE: 120 MMHG | HEART RATE: 64 BPM

## 2024-05-16 DIAGNOSIS — R05.3 CHRONIC COUGH: ICD-10-CM

## 2024-05-16 DIAGNOSIS — R09.82 POSTNASAL DRIP: ICD-10-CM

## 2024-05-16 PROCEDURE — 99214 OFFICE O/P EST MOD 30 MIN: CPT

## 2024-05-16 RX ORDER — IPRATROPIUM BROMIDE 21 UG/1
0.03 SPRAY NASAL TWICE DAILY
Qty: 1 | Refills: 6 | Status: ACTIVE | COMMUNITY
Start: 2024-05-16 | End: 1900-01-01

## 2024-05-16 RX ORDER — FLUOXETINE HYDROCHLORIDE 10 MG/1
10 CAPSULE ORAL
Qty: 30 | Refills: 0 | Status: DISCONTINUED | COMMUNITY
Start: 2022-05-16 | End: 2024-05-16

## 2024-05-16 RX ORDER — CIPROFLOXACIN HYDROCHLORIDE 500 MG/1
500 TABLET, FILM COATED ORAL TWICE DAILY
Qty: 28 | Refills: 1 | Status: DISCONTINUED | COMMUNITY
Start: 2024-04-16 | End: 2024-05-16

## 2024-05-16 NOTE — DISCUSSION/SUMMARY
[FreeTextEntry1] : Ms. Monson has a chronic cough and congestion.  There is no definite evidence of primary pulmonary disease.  It is possible to postnasal drip and allergy season.  We will start Atrovent nasal spray 2 sprays twice a day and see if this resolves symptoms.  She recently had a chest x-ray at [facility] we will obtain that study.  Also she says she sent in sputum cultures the results are not available but will obtain those as well. The patient understands and agrees with plan of care. Today's office visit encompassed 32 minutes. I conducted an extensive history, physical exam and reviewed diagnosis and treatment options including diagnostic tests,radiology studies including cat scans and the use of prescription medication.

## 2024-05-16 NOTE — REASON FOR VISIT
[Follow-Up] : a follow-up visit [Cough] : cough [Shortness of Breath] : shortness of breath [TextEntry] : 1 month. Patient feel no improvement with the medication. Patient still experiencing a wet phlegm with her cough, sometimes there is blood in the phlegm. Patient complaints of sob and crackles of her lung when breathing.

## 2024-05-16 NOTE — HISTORY OF PRESENT ILLNESS
[Never] : never [TextBox_4] : 88 female hx ch bronchitis  today feels fair still with cough and mucous  and sneeze ++ sob and no wheeze  +  post nasal drip syndrome

## 2024-06-17 ENCOUNTER — NON-APPOINTMENT (OUTPATIENT)
Age: 89
End: 2024-06-17

## 2024-06-18 ENCOUNTER — APPOINTMENT (OUTPATIENT)
Dept: PULMONOLOGY | Facility: CLINIC | Age: 89
End: 2024-06-18

## 2024-07-23 ENCOUNTER — APPOINTMENT (OUTPATIENT)
Dept: PULMONOLOGY | Facility: CLINIC | Age: 89
End: 2024-07-23
Payer: MEDICARE

## 2024-07-23 VITALS
TEMPERATURE: 98.1 F | SYSTOLIC BLOOD PRESSURE: 126 MMHG | HEIGHT: 64 IN | WEIGHT: 121 LBS | HEART RATE: 60 BPM | OXYGEN SATURATION: 97 % | BODY MASS INDEX: 20.66 KG/M2 | DIASTOLIC BLOOD PRESSURE: 80 MMHG

## 2024-07-23 DIAGNOSIS — R06.09 OTHER FORMS OF DYSPNEA: ICD-10-CM

## 2024-07-23 PROCEDURE — ZZZZZ: CPT

## 2024-07-23 PROCEDURE — 99214 OFFICE O/P EST MOD 30 MIN: CPT | Mod: 25

## 2024-07-23 PROCEDURE — 94010 BREATHING CAPACITY TEST: CPT

## 2024-07-23 PROCEDURE — 94729 DIFFUSING CAPACITY: CPT

## 2024-07-23 PROCEDURE — 94727 GAS DIL/WSHOT DETER LNG VOL: CPT

## 2024-07-23 NOTE — PROCEDURE
[FreeTextEntry1] : Pulmonary function test performed 7/23/2024 no obstructive restrictive lung disease.  Moderate decreased diffusion capacity consistent with possible interstitial disease versus pulmonary vascular disease

## 2024-07-23 NOTE — HISTORY OF PRESENT ILLNESS
[Never] : never [TextBox_4] : 89 female feels good  some chest discomfort  and tightness ++ pvcs  had mastectomy and implant placed and has gotten hard and bigger  not fur removal per Ohea surgery sl cough and mucous prior sputum cx negative  using atrovent nasal for pnds and working well

## 2024-07-23 NOTE — DISCUSSION/SUMMARY
[FreeTextEntry1] : Ms. Monson complains of significant dyspnea on exertion.  It is unclear what the etiology is.  She has no evidence of obstructive disease but she does have a decreased diffusion capacity consistent with possible interstitial disease.  Her most recent chest x-ray was clear.  Is possible that her dyspnea is secondary to cardiac disease.  I have asked her to obtain an echocardiogram and based on those results further recommendations will be made.  Patient understands and agrees with this plan of care.

## 2024-07-23 NOTE — REASON FOR VISIT
[Follow-Up] : a follow-up visit [Shortness of Breath] : shortness of breath [TextEntry] : 1 month. Patient complaints of sob.

## 2024-08-22 ENCOUNTER — APPOINTMENT (OUTPATIENT)
Dept: PULMONOLOGY | Facility: CLINIC | Age: 89
End: 2024-08-22

## 2024-08-22 ENCOUNTER — APPOINTMENT (OUTPATIENT)
Dept: PULMONOLOGY | Facility: CLINIC | Age: 89
End: 2024-08-22
Payer: MEDICARE

## 2024-08-22 VITALS
BODY MASS INDEX: 20.49 KG/M2 | WEIGHT: 120 LBS | SYSTOLIC BLOOD PRESSURE: 120 MMHG | HEIGHT: 64 IN | TEMPERATURE: 97.7 F | OXYGEN SATURATION: 96 % | HEART RATE: 62 BPM | DIASTOLIC BLOOD PRESSURE: 78 MMHG

## 2024-08-22 DIAGNOSIS — R09.82 POSTNASAL DRIP: ICD-10-CM

## 2024-08-22 DIAGNOSIS — Z86.79 PERSONAL HISTORY OF OTHER DISEASES OF THE CIRCULATORY SYSTEM: ICD-10-CM

## 2024-08-22 DIAGNOSIS — Z86.39 PERSONAL HISTORY OF OTHER ENDOCRINE, NUTRITIONAL AND METABOLIC DISEASE: ICD-10-CM

## 2024-08-22 DIAGNOSIS — R05.3 CHRONIC COUGH: ICD-10-CM

## 2024-08-22 PROCEDURE — 99215 OFFICE O/P EST HI 40 MIN: CPT

## 2024-08-22 RX ORDER — LOSARTAN POTASSIUM 25 MG/1
25 TABLET, FILM COATED ORAL TWICE DAILY
Refills: 0 | Status: ACTIVE | COMMUNITY

## 2024-08-22 RX ORDER — LEVOFLOXACIN 500 MG/1
500 TABLET, FILM COATED ORAL DAILY
Qty: 14 | Refills: 0 | Status: ACTIVE | COMMUNITY
Start: 2024-08-22 | End: 1900-01-01

## 2024-08-22 RX ORDER — PREDNISONE 5 MG/1
5 TABLET ORAL
Qty: 60 | Refills: 3 | Status: ACTIVE | COMMUNITY
Start: 2024-08-22 | End: 1900-01-01

## 2024-08-22 NOTE — HISTORY OF PRESENT ILLNESS
[Never] : never [TextBox_4] : 89 female co persistent   dyspnea on exertion  today feels very poor  notes severe  dyspnea on exertion  ++cough and mucous  no blood no fever no chest pain no tightness ++rhonchi with lying down

## 2024-08-22 NOTE — DISCUSSION/SUMMARY
[FreeTextEntry1] : Ms. Monson has a persistent cough and rhonchi.  She is been on multiple antibiotics multiple sputum cultures have been nonrevealing and she is not improving.  Her chest x-ray as part is normal.  I reviewed it myself and I think there is a haze at the left base.  Will obtain a CAT scan of the chest.  She has been on Cipro in the past.  We will start Levaquin 500 mg 1 tablet a day for 14 days.  I have asked her to take prednisone 5 mg twice a day.  We will also obtain a repeat sputum culture to see if any bacteria is isolated. The patient understands and agrees with plan of care. Today's office visit encompassed 32 minutes. I conducted an extensive history, physical exam and reviewed diagnosis and treatment options including diagnostic tests,radiology studies including cat scans and the use of prescription medication.

## 2024-08-22 NOTE — PHYSICAL EXAM
[No Acute Distress] : no acute distress [Normal Oropharynx] : normal oropharynx [Normal Appearance] : normal appearance [No Neck Mass] : no neck mass [Normal Rate/Rhythm] : normal rate/rhythm [Normal S1, S2] : normal s1, s2 [No Murmurs] : no murmurs [No Resp Distress] : no resp distress [Clear to Auscultation Bilaterally] : clear to auscultation bilaterally [Rhonchi] : rhonchi [No Abnormalities] : no abnormalities [Benign] : benign [Normal Gait] : normal gait [No Clubbing] : no clubbing [No Cyanosis] : no cyanosis [No Edema] : no edema [FROM] : FROM [Normal Color/ Pigmentation] : normal color/ pigmentation [No Focal Deficits] : no focal deficits [Oriented x3] : oriented x3 [Normal Affect] : normal affect [TextBox_68] : Good aeration with scattered rhonchi bilateral

## 2024-08-22 NOTE — REASON FOR VISIT
[Follow-Up] : a follow-up visit [Cough] : cough [Shortness of Breath] : shortness of breath [Wheezing] : wheezing [TextEntry] : 1 month. Patient states she does not feel any better. She states she has wheezing, a mucus producing cough, SOB with her coughing and in the shower. Patient did not have the Echo done. She states she was not aware of it.

## 2024-09-03 ENCOUNTER — NON-APPOINTMENT (OUTPATIENT)
Age: 89
End: 2024-09-03

## 2024-09-06 ENCOUNTER — APPOINTMENT (OUTPATIENT)
Dept: PULMONOLOGY | Facility: CLINIC | Age: 89
End: 2024-09-06
Payer: MEDICARE

## 2024-09-06 VITALS
BODY MASS INDEX: 20.49 KG/M2 | DIASTOLIC BLOOD PRESSURE: 80 MMHG | HEIGHT: 64 IN | OXYGEN SATURATION: 99 % | HEART RATE: 74 BPM | SYSTOLIC BLOOD PRESSURE: 122 MMHG | TEMPERATURE: 97.2 F | WEIGHT: 120 LBS

## 2024-09-06 DIAGNOSIS — R05.3 CHRONIC COUGH: ICD-10-CM

## 2024-09-06 DIAGNOSIS — R00.0 TACHYCARDIA, UNSPECIFIED: ICD-10-CM

## 2024-09-06 PROCEDURE — 99215 OFFICE O/P EST HI 40 MIN: CPT

## 2024-09-06 RX ORDER — FLUTICASONE PROPIONATE 50 UG/1
50 SPRAY, METERED NASAL DAILY
Qty: 1 | Refills: 6 | Status: ACTIVE | COMMUNITY
Start: 2024-09-06 | End: 1900-01-01

## 2024-09-06 NOTE — PROCEDURE
[FreeTextEntry1] : CT chest 8/27/2024 compared to 8/9/2024 x-ray no evidence of airspace or consolidation right lower lobe 4 mm nodule 3 mm left upper lobe nodule 2 mm right middle lobe nodule. These are very nonspecific and a non-smoker n

## 2024-09-06 NOTE — DISCUSSION/SUMMARY
[FreeTextEntry1] : Ms. Monson has a consistent persistent cough.  Her lungs are generally clear.  She did not respond to antibiotics or steroids.  I have asked her to obtain x-rays of the sinuses.  We will increase her Atrovent nasal spray to 2 sprays twice a day and add Flonase 2 sprays every morning.  The CAT scan shows very nonspecific small nodules the largest 4 mm.  In an 89-year-old patient with a minimal smoking history no follow-up is needed.  The patient has a tachycardia with some extra beats.  A Holter monitor has been ordered by her primary care physician.  I have placed a call to her cardiologist and awaiting return call. The patient understands and agrees with the plan of care. Today's office visit encompassed 42 minutes. I conducted an extensive history, physical exam and reviewed diagnosis and treatment options including diagnostic tests radiology studies including cat scans and the use of prescription medication.

## 2024-09-06 NOTE — HISTORY OF PRESENT ILLNESS
[Never] : never [TextBox_4] : 80 female hx  dyspnea on exertion  last seen 2 week ago for cough and phlegm now notes episodes fo rapid heart beat feels poor bec no chest pain no tightness++mucous  no fever  no blood  completed levaquin and prednisone and no improvement

## 2024-09-06 NOTE — REASON FOR VISIT
[Follow-Up] : a follow-up visit [Cough] : cough [Shortness of Breath] : shortness of breath [Wheezing] : wheezing [TextEntry] : 2 week. Patient complaint of coughing, sob and wheezing. Patient got her CT scan done.

## 2024-09-06 NOTE — PHYSICAL EXAM
[No Acute Distress] : no acute distress [Normal Oropharynx] : normal oropharynx [Normal Appearance] : normal appearance [No Neck Mass] : no neck mass [Normal Rate/Rhythm] : normal rate/rhythm [Normal S1, S2] : normal s1, s2 [No Murmurs] : no murmurs [No Resp Distress] : no resp distress [Clear to Auscultation Bilaterally] : clear to auscultation bilaterally [Rhonchi] : rhonchi [No Abnormalities] : no abnormalities [Benign] : benign [Normal Gait] : normal gait [No Clubbing] : no clubbing [No Cyanosis] : no cyanosis [No Edema] : no edema [FROM] : FROM [Normal Color/ Pigmentation] : normal color/ pigmentation [No Focal Deficits] : no focal deficits [Oriented x3] : oriented x3 [Normal Affect] : normal affect [TextBox_54] : Tachycardic with extra beats [TextBox_68] : Good aeration with minimal rhonchi bilateral

## 2024-09-20 ENCOUNTER — APPOINTMENT (OUTPATIENT)
Dept: PULMONOLOGY | Facility: CLINIC | Age: 89
End: 2024-09-20
Payer: MEDICARE

## 2024-09-20 VITALS
HEART RATE: 62 BPM | HEIGHT: 64 IN | TEMPERATURE: 97.8 F | SYSTOLIC BLOOD PRESSURE: 130 MMHG | OXYGEN SATURATION: 97 % | BODY MASS INDEX: 20.49 KG/M2 | WEIGHT: 120 LBS | DIASTOLIC BLOOD PRESSURE: 82 MMHG

## 2024-09-20 DIAGNOSIS — R05.3 CHRONIC COUGH: ICD-10-CM

## 2024-09-20 PROCEDURE — 99214 OFFICE O/P EST MOD 30 MIN: CPT

## 2024-09-20 RX ORDER — VERAPAMIL HYDROCHLORIDE 40 MG/1
40 TABLET ORAL
Refills: 0 | Status: ACTIVE | COMMUNITY

## 2024-09-20 NOTE — DISCUSSION/SUMMARY
[FreeTextEntry1] : Ms. Monson has a chronic cough.  She seems to have improved with the nasal sprays are likely some postnasal drip is present.  She never obtained the sinus x-rays.   Her lungs are clear.  I do not want start any other therapy.  Will continue current management. The patient understands and agrees with plan of care. Today's office visit encompassed 32 minutes. I conducted an extensive history, physical exam and reviewed diagnosis and treatment options including diagnostic tests,radiology studies including cat scans and the use of prescription medication.

## 2024-09-20 NOTE — HISTORY OF PRESENT ILLNESS
[Never] : never [TextBox_4] : 89 female with ch cough and nasal congestion no sinus xray done using nasal spray and feel better  less cough but still sneeze multiple times still some phlegm  has improved  sl sob and wheeze

## 2024-09-20 NOTE — REASON FOR VISIT
[Follow-Up] : a follow-up visit [Cough] : cough [Shortness of Breath] : shortness of breath [TextEntry] : 2 weeks. XRay of sinus not performed. Patient has SOB with activity and cough with light brown mucus.

## 2024-12-26 ENCOUNTER — APPOINTMENT (OUTPATIENT)
Dept: ORTHOPEDIC SURGERY | Facility: CLINIC | Age: 88
End: 2024-12-26
Payer: MEDICARE

## 2024-12-26 DIAGNOSIS — Z98.890 OTHER SPECIFIED POSTPROCEDURAL STATES: ICD-10-CM

## 2024-12-26 PROCEDURE — 73503 X-RAY EXAM HIP UNI 4/> VIEWS: CPT | Mod: LT

## 2024-12-26 PROCEDURE — 99024 POSTOP FOLLOW-UP VISIT: CPT

## 2025-01-13 ENCOUNTER — APPOINTMENT (OUTPATIENT)
Dept: ORTHOPEDIC SURGERY | Facility: CLINIC | Age: 89
End: 2025-01-13
Payer: MEDICARE

## 2025-01-13 DIAGNOSIS — S72.22XA DISPLACED SUBTROCHANTERIC FRACTURE OF LEFT FEMUR, INITIAL ENCOUNTER FOR CLOSED FRACTURE: ICD-10-CM

## 2025-01-13 PROCEDURE — 99024 POSTOP FOLLOW-UP VISIT: CPT

## 2025-02-13 ENCOUNTER — APPOINTMENT (OUTPATIENT)
Dept: PULMONOLOGY | Facility: CLINIC | Age: 89
End: 2025-02-13
Payer: MEDICARE

## 2025-02-13 VITALS
OXYGEN SATURATION: 95 % | DIASTOLIC BLOOD PRESSURE: 82 MMHG | HEIGHT: 64 IN | SYSTOLIC BLOOD PRESSURE: 120 MMHG | WEIGHT: 120 LBS | HEART RATE: 65 BPM | BODY MASS INDEX: 20.49 KG/M2 | TEMPERATURE: 97.1 F

## 2025-02-13 DIAGNOSIS — R09.82 POSTNASAL DRIP: ICD-10-CM

## 2025-02-13 PROCEDURE — 99214 OFFICE O/P EST MOD 30 MIN: CPT

## 2025-02-25 ENCOUNTER — APPOINTMENT (OUTPATIENT)
Dept: ORTHOPEDIC SURGERY | Facility: CLINIC | Age: 89
End: 2025-02-25

## 2025-06-03 ENCOUNTER — APPOINTMENT (OUTPATIENT)
Dept: PULMONOLOGY | Facility: CLINIC | Age: 89
End: 2025-06-03
Payer: MEDICARE

## 2025-06-03 VITALS
HEIGHT: 64 IN | TEMPERATURE: 97.3 F | DIASTOLIC BLOOD PRESSURE: 82 MMHG | HEART RATE: 70 BPM | WEIGHT: 120 LBS | SYSTOLIC BLOOD PRESSURE: 124 MMHG | BODY MASS INDEX: 20.49 KG/M2 | OXYGEN SATURATION: 98 %

## 2025-06-03 DIAGNOSIS — R09.82 POSTNASAL DRIP: ICD-10-CM

## 2025-06-03 DIAGNOSIS — R05.3 CHRONIC COUGH: ICD-10-CM

## 2025-06-03 PROCEDURE — 99214 OFFICE O/P EST MOD 30 MIN: CPT

## 2025-08-28 ENCOUNTER — APPOINTMENT (OUTPATIENT)
Dept: ORTHOPEDIC SURGERY | Facility: CLINIC | Age: 89
End: 2025-08-28
Payer: MEDICARE

## 2025-08-28 VITALS — HEIGHT: 64 IN | BODY MASS INDEX: 20.49 KG/M2 | WEIGHT: 120 LBS

## 2025-08-28 DIAGNOSIS — M76.892 OTHER SPECIFIED ENTHESOPATHIES OF LEFT LOWER LIMB, EXCLUDING FOOT: ICD-10-CM

## 2025-08-28 DIAGNOSIS — M62.81 MUSCLE WEAKNESS (GENERALIZED): ICD-10-CM

## 2025-08-28 DIAGNOSIS — Z98.890 OTHER SPECIFIED POSTPROCEDURAL STATES: ICD-10-CM

## 2025-08-28 DIAGNOSIS — S76.812A STRAIN OF OTHER SPECIFIED MUSCLES, FASCIA AND TENDONS AT THIGH LEVEL, LEFT THIGH, INITIAL ENCOUNTER: ICD-10-CM

## 2025-08-28 DIAGNOSIS — Z96.652 PRESENCE OF LEFT ARTIFICIAL KNEE JOINT: ICD-10-CM

## 2025-08-28 DIAGNOSIS — M62.89 OTHER SPECIFIED DISORDERS OF MUSCLE: ICD-10-CM

## 2025-08-28 PROCEDURE — 99213 OFFICE O/P EST LOW 20 MIN: CPT

## 2025-08-28 PROCEDURE — 73562 X-RAY EXAM OF KNEE 3: CPT | Mod: LT

## 2025-08-28 PROCEDURE — 73502 X-RAY EXAM HIP UNI 2-3 VIEWS: CPT
